# Patient Record
Sex: MALE | Race: WHITE | HISPANIC OR LATINO | URBAN - METROPOLITAN AREA
[De-identification: names, ages, dates, MRNs, and addresses within clinical notes are randomized per-mention and may not be internally consistent; named-entity substitution may affect disease eponyms.]

---

## 2021-07-09 ENCOUNTER — OFFICE (OUTPATIENT)
Dept: URBAN - METROPOLITAN AREA CLINIC 79 | Facility: CLINIC | Age: 31
End: 2021-07-09
Payer: MEDICAID

## 2021-07-09 VITALS
TEMPERATURE: 97 F | DIASTOLIC BLOOD PRESSURE: 68 MMHG | HEIGHT: 66 IN | HEART RATE: 63 BPM | WEIGHT: 169 LBS | SYSTOLIC BLOOD PRESSURE: 119 MMHG

## 2021-07-09 DIAGNOSIS — R19.8 OTHER SPECIFIED SYMPTOMS AND SIGNS INVOLVING THE DIGESTIVE S: ICD-10-CM

## 2021-07-09 DIAGNOSIS — K21.9 GASTRO-ESOPHAGEAL REFLUX DISEASE WITHOUT ESOPHAGITIS: ICD-10-CM

## 2021-07-09 PROCEDURE — 99204 OFFICE O/P NEW MOD 45 MIN: CPT | Performed by: PHYSICIAN ASSISTANT

## 2021-07-09 NOTE — SERVICEHPINOTES
GUILLAUME PORRAS   is a   30   year old male who is being seen in consultation at the request of   RILEY PADILLA   for GERD. He's had issues for about 2 years. Tums stopped working. He has tried Prilosec or Nexium OTC but these seemed to become less effective. He is currently on Pepcid 40 mg every 1-2 days and this helps. Was on omeprazole but had to stop this due to feeling jittery. Can have night-time reflux at times, despite avoiding food before bed. Consuming dairy almost always bothers him. Sometimes drinking cold water will cause some chest pain and he can feel it going down. He denies dysphagia but can have globus sensation. Has cut back on spicy foods and stopped drinking soda and has lost weight. He had cardiac evaluation due to chest pain and evaluation was negative. He reports normal bowel habits. No other concerns today.